# Patient Record
Sex: FEMALE | Race: WHITE | NOT HISPANIC OR LATINO | Employment: FULL TIME | URBAN - METROPOLITAN AREA
[De-identification: names, ages, dates, MRNs, and addresses within clinical notes are randomized per-mention and may not be internally consistent; named-entity substitution may affect disease eponyms.]

---

## 2018-08-02 ENCOUNTER — OFFICE VISIT (OUTPATIENT)
Dept: SURGERY | Facility: CLINIC | Age: 48
End: 2018-08-02

## 2018-08-02 VITALS — HEART RATE: 72 BPM | BODY MASS INDEX: 33.59 KG/M2 | HEIGHT: 67 IN | OXYGEN SATURATION: 98 % | WEIGHT: 214 LBS

## 2018-08-02 DIAGNOSIS — Z85.038 HISTORY OF COLON CANCER: Primary | ICD-10-CM

## 2018-08-02 DIAGNOSIS — R19.4 BOWEL HABIT CHANGES: ICD-10-CM

## 2018-08-02 PROCEDURE — 99214 OFFICE O/P EST MOD 30 MIN: CPT | Performed by: SURGERY

## 2018-08-02 RX ORDER — CEPHALEXIN 500 MG/1
500 CAPSULE ORAL DAILY
COMMUNITY
End: 2019-04-25

## 2018-08-03 NOTE — PROGRESS NOTES
SUMMARY (A/P):    47-year-old lady significant change in bowel habits and personal history of colon cancer.  Discussed options and she wishes to proceed with colonoscopy.  She understands rationale for the procedure, the nature of the procedure, and the risks.      CC:    Change in bowel habits    HPI:    47-year-old lady presents with 3 month history of significant bowel habit changes alternating between moderate constipation to moderate diarrhea associated with mild generalized abdominal pain that is worse with meals.  No rectal bleeding.  Prior to 3 months ago she was having normal regular bowel movements.    PSH:    -Colonoscopy 2016-normal  -Incisional hernia repair with mesh 2016  -Laparoscopic left colectomy 2015 for colon cancer by Dr. Nicolas Sanchez (pathology described no residual polyp or malignancy)  - ×2  -Colonoscopy 2015 by Dr. Judson Rea (pathology described fragments of tubulovillous adenoma with foci of adenocarcinoma Limited to the mucosa with microscopic focus suspicious for early invasion)    PMH:    Colon cancer  Gastroesophageal reflux disease  Hypertension    FAMILY HISTORY:    Negative for colorectal cancer    SOCIAL HISTORY:   Denies tobacco use  Denies alcohol use    ALLERGIES: reviewed, in Epic    MEDICATIONS: reviewed, in Epic    ROS:  No chest pain or shortness of air.  All other systems reviewed and negative other than presenting complaints.    PHYSICAL EXAM:   Constitutional: Well-developed well-nourished, no acute distress  Vital signs: Heart rate 72, weight 214 pounds, height 67 inches, BMI 33.5  Eyes: Conjunctiva normal, sclera nonicteric  ENMT: Hearing grossly normal, oral mucosa moist  Neck: Supple, no palpable mass, normal thyroid, trachea midline  Respiratory: Clear to auscultation, normal inspiratory effort  Cardiovascular: Regular rate, no murmur, no carotid bruit, no peripheral edema, no jugular venous distention  Gastrointestinal:  Soft, nontender, no palpable mass, no hepatosplenomegaly, negative for hernia, bowel sounds normal, well-healed incision at and below navel from previous incisional hernia repair with no evidence of recurrence  Lymphatics (palpable nodes):  cervical-negative, axillary-negative  Skin:  Warm, dry, no rash on visualized skin surfaces  Musculoskeletal: Symmetric strength, normal gait  Psychiatric: Alert and oriented ×3, normal affect     DEVANG OLMOS M.D.

## 2018-08-08 ENCOUNTER — HOSPITAL ENCOUNTER (OUTPATIENT)
Facility: HOSPITAL | Age: 48
Setting detail: HOSPITAL OUTPATIENT SURGERY
Discharge: HOME | End: 2018-08-08
Attending: SURGERY | Admitting: SURGERY

## 2018-08-08 ENCOUNTER — ANESTHESIA EVENT (OUTPATIENT)
Dept: GASTROENTEROLOGY | Facility: HOSPITAL | Age: 48
End: 2018-08-08

## 2018-08-08 ENCOUNTER — ANESTHESIA (OUTPATIENT)
Dept: GASTROENTEROLOGY | Facility: HOSPITAL | Age: 48
End: 2018-08-08

## 2018-08-08 VITALS
BODY MASS INDEX: 32.49 KG/M2 | TEMPERATURE: 98.1 F | RESPIRATION RATE: 16 BRPM | HEIGHT: 67 IN | DIASTOLIC BLOOD PRESSURE: 87 MMHG | HEART RATE: 67 BPM | WEIGHT: 207 LBS | SYSTOLIC BLOOD PRESSURE: 129 MMHG | OXYGEN SATURATION: 100 %

## 2018-08-08 PROCEDURE — 25010000002 PROPOFOL 10 MG/ML EMULSION: Performed by: ANESTHESIOLOGY

## 2018-08-08 PROCEDURE — 45378 DIAGNOSTIC COLONOSCOPY: CPT | Performed by: SURGERY

## 2018-08-08 PROCEDURE — 25010000002 PROPOFOL 1000 MG/ML EMULSION: Performed by: ANESTHESIOLOGY

## 2018-08-08 RX ORDER — SODIUM CHLORIDE 0.9 % (FLUSH) 0.9 %
3 SYRINGE (ML) INJECTION AS NEEDED
Status: DISCONTINUED | OUTPATIENT
Start: 2018-08-08 | End: 2018-08-08 | Stop reason: HOSPADM

## 2018-08-08 RX ORDER — LIDOCAINE HYDROCHLORIDE 10 MG/ML
0.5 INJECTION, SOLUTION INFILTRATION; PERINEURAL ONCE AS NEEDED
Status: DISCONTINUED | OUTPATIENT
Start: 2018-08-08 | End: 2018-08-08 | Stop reason: HOSPADM

## 2018-08-08 RX ORDER — PROPOFOL 10 MG/ML
VIAL (ML) INTRAVENOUS AS NEEDED
Status: DISCONTINUED | OUTPATIENT
Start: 2018-08-08 | End: 2018-08-08 | Stop reason: SURG

## 2018-08-08 RX ORDER — LIDOCAINE HYDROCHLORIDE 20 MG/ML
INJECTION, SOLUTION INFILTRATION; PERINEURAL AS NEEDED
Status: DISCONTINUED | OUTPATIENT
Start: 2018-08-08 | End: 2018-08-08 | Stop reason: SURG

## 2018-08-08 RX ORDER — SODIUM CHLORIDE, SODIUM LACTATE, POTASSIUM CHLORIDE, CALCIUM CHLORIDE 600; 310; 30; 20 MG/100ML; MG/100ML; MG/100ML; MG/100ML
1000 INJECTION, SOLUTION INTRAVENOUS CONTINUOUS
Status: DISCONTINUED | OUTPATIENT
Start: 2018-08-08 | End: 2018-08-08 | Stop reason: HOSPADM

## 2018-08-08 RX ADMIN — SODIUM CHLORIDE, POTASSIUM CHLORIDE, SODIUM LACTATE AND CALCIUM CHLORIDE 1000 ML: 600; 310; 30; 20 INJECTION, SOLUTION INTRAVENOUS at 09:54

## 2018-08-08 RX ADMIN — PROPOFOL 150 MG: 10 INJECTION, EMULSION INTRAVENOUS at 10:06

## 2018-08-08 RX ADMIN — LIDOCAINE HYDROCHLORIDE 50 MG: 20 INJECTION, SOLUTION INFILTRATION; PERINEURAL at 10:06

## 2018-08-08 RX ADMIN — PROPOFOL 140 MCG/KG/MIN: 10 INJECTION, EMULSION INTRAVENOUS at 10:06

## 2018-08-08 NOTE — OP NOTE
PREOPERATIVE DIAGNOSIS:  -Change in bowel habits  -Personal history of colon cancer status post left colectomy December 2015 for tubulovillous adenoma with foci of adenocarcinoma limited to the mucosa    POSTOPERATIVE DIAGNOSIS AND FINDINGS:  Normal mucosa  Diverticulosis    PROCEDURE:  Colonoscopy to cecum     SURGEON:  Roverto Sapp MD    ANESTHESIA:  MAC    SPECIMEN(S):  none    DESCRIPTION:  In decubitus position digital rectal exam was normal. Colonoscope inserted under direct visualization of lumen to cecum confirmed by visualization of ileocecal valve and appendiceal orifice.    Scope slowly withdrawn circumferentially examining all mucosal surfaces.    Quality of bowel preparation good.    There were no mucosal abnormalities.     Scattered diverticulosis seen.    Tolerated well.    RECOMMENDATION FOR FUTURE SURVEILLANCE:  3 years    Roverto Sapp M.D.

## 2018-08-08 NOTE — ANESTHESIA PREPROCEDURE EVALUATION
Anesthesia Evaluation     Patient summary reviewed   NPO Solid Status: > 8 hours  NPO Liquid Status: > 8 hours           Airway   Mallampati: II  TM distance: >3 FB  Dental      Pulmonary    Cardiovascular     Rhythm: regular  Rate: normal    (+) hypertension,       Neuro/Psych  GI/Hepatic/Renal/Endo    (+) obesity,  GERD,      Musculoskeletal     Abdominal    Substance History      OB/GYN          Other      history of cancer remission                    Anesthesia Plan    ASA 2     MAC   total IV anesthesia  Anesthetic plan and risks discussed with patient.

## 2018-08-08 NOTE — ANESTHESIA POSTPROCEDURE EVALUATION
"Patient: Shannon Ley    Procedure Summary     Date:  08/08/18 Room / Location:   [facility]    Anesthesia Start:  1002 Anesthesia Stop:  1016    Procedure:  COLONOSCOPY TO CECUM (N/A ) Diagnosis:       History of colon cancer      Bowel habit changes      (History of colon cancer [Z85.038])      (Bowel habit changes [R19.4])    Surgeon:  Roverto Sapp MD Provider:  Sidra Martino MD    Anesthesia Type:  MAC ASA Status:  2          Anesthesia Type: MAC  Last vitals  BP   104/62 (08/08/18 1017)   Temp   36.7 °C (98.1 °F) (08/08/18 0939)   Pulse   62 (08/08/18 1017)   Resp   16 (08/08/18 1017)     SpO2   96 % (08/08/18 1017)     Post Anesthesia Care and Evaluation    Patient location during evaluation: bedside  Patient participation: complete - patient participated  Level of consciousness: awake and alert  Pain management: adequate  Airway patency: patent  Anesthetic complications: No anesthetic complications  PONV Status: none  Cardiovascular status: acceptable  Respiratory status: acceptable  Hydration status: acceptable    Comments: /62 (BP Location: Left arm, Patient Position: Lying)   Pulse 62   Temp 36.7 °C (98.1 °F) (Oral)   Resp 16   Ht 170.2 cm (67\")   Wt 93.9 kg (207 lb)   SpO2 96%   BMI 32.42 kg/m²         "

## 2021-05-06 ENCOUNTER — OFFICE VISIT (OUTPATIENT)
Dept: SURGERY | Facility: CLINIC | Age: 51
End: 2021-05-06

## 2021-05-06 VITALS — BODY MASS INDEX: 33.33 KG/M2 | HEIGHT: 69 IN | WEIGHT: 225 LBS

## 2021-05-06 DIAGNOSIS — Z85.038 HX OF COLON CANCER, STAGE I: ICD-10-CM

## 2021-05-06 DIAGNOSIS — R19.7 DIARRHEA, UNSPECIFIED TYPE: Primary | ICD-10-CM

## 2021-05-06 DIAGNOSIS — K62.5 RECTAL BLEEDING: ICD-10-CM

## 2021-05-06 DIAGNOSIS — R19.8 CHANGE IN BOWEL MOVEMENT: ICD-10-CM

## 2021-05-06 PROCEDURE — 99214 OFFICE O/P EST MOD 30 MIN: CPT | Performed by: SURGERY

## 2021-05-06 RX ORDER — CYCLOBENZAPRINE HCL 10 MG
TABLET ORAL
COMMUNITY
Start: 2021-03-15 | End: 2021-05-06

## 2021-05-06 RX ORDER — ERGOCALCIFEROL 1.25 MG/1
5000 CAPSULE ORAL DAILY
COMMUNITY

## 2021-05-09 NOTE — PROGRESS NOTES
"SUMMARY (A/P):    50-year-old lady presents with personal history of T1 colon cancer, intermittent diarrhea, occasional bright red blood per rectum, change in bowel habits with \"pencil thin stools\", and family history of colon cancer in her father and paternal grandfather.  With this presentation, I have recommended further evaluation with colonoscopy (including random biopsies) and she has scheduled accordingly.      CC:    Diarrhea, referred for consultation by Dr. Clarisa Graham    HPI:    50-year-old lady presents with personal history of T1 left colon cancer and recent history in the last 2 months of intermittent diarrhea, occasional bright red blood per rectum, and change in bowel habits with what she describes as pencil thin stools.  Additionally, she has family history of colon cancer in her father and paternal grandfather.    ENDOSCOPY:   • Colonoscopy 2018: Diverticulosis  • Colonoscopy 2015 by Dr. Rea (pathology described fragments of tubulovillous adenoma with foci of adenocarcinoma limited to the mucosa with microscopic focus suspicious for early invasion)    RADIOLOGY:   •     LABS:    •     PREVIOUS ABDOMINAL SURGERY    • Open incisional hernia repair with mesh 2016  • Laparoscopic left colectomy 2015 Dr. Nicolas Sanchez for tubulovillous adenoma with foci of adenocarcinoma limited to the mucosa (pathology from the surgery described no residual polyp or malignancy)  •  section x2    PMH:    • T1 colon cancer   • Hypertension  • Gastroesophageal reflux disease    ALLERGIES:   • Oxycodone  • Latex  • Sulfa    MEDICATIONS:   • None    FAMILY HISTORY:    • Colorectal cancer: Father, paternal grandfather (she only recently became aware of this part of her family history)    SOCIAL HISTORY:   • Denies tobacco use  • Occasional alcohol use    PHYSICAL EXAM:   • Constitutional: Well-developed well-nourished, no acute distress  • Vital signs: Weight 225 pounds, height " 69 inches, BMI 33.2  • Eyes: Conjunctiva normal, sclera nonicteric  • ENMT: Hearing grossly normal, oral mucosa moist  • Neck: Supple, no palpable mass, trachea midline  • Respiratory: Clear to auscultation, normal inspiratory effort  • Cardiovascular: Regular rate, no murmur, no carotid bruit  • Gastrointestinal: Soft, nontender, no palpable mass, no hepatosplenomegaly, negative for hernia  • Lymphatics (palpable nodes):  cervical-negative, axillary-negative  • Skin:  Warm, dry, no rash on visualized skin surfaces  • Musculoskeletal: Symmetric strength, normal gait  • Psychiatric: Alert and oriented ×3, normal affect     ROS:    No cough, chest pain, shortness of air.  All other systems reviewed and negative other than presenting complaints.    DEVANG OLMOS M.D.

## 2021-06-10 ENCOUNTER — HOSPITAL ENCOUNTER (OUTPATIENT)
Facility: HOSPITAL | Age: 51
Setting detail: HOSPITAL OUTPATIENT SURGERY
Discharge: HOME | End: 2021-06-10
Attending: SURGERY | Admitting: SURGERY

## 2021-06-10 ENCOUNTER — ANESTHESIA (OUTPATIENT)
Dept: GASTROENTEROLOGY | Facility: HOSPITAL | Age: 51
End: 2021-06-10

## 2021-06-10 ENCOUNTER — ANESTHESIA EVENT (OUTPATIENT)
Dept: GASTROENTEROLOGY | Facility: HOSPITAL | Age: 51
End: 2021-06-10

## 2021-06-10 VITALS
TEMPERATURE: 98.2 F | WEIGHT: 220 LBS | RESPIRATION RATE: 16 BRPM | HEART RATE: 77 BPM | OXYGEN SATURATION: 99 % | BODY MASS INDEX: 32.58 KG/M2 | SYSTOLIC BLOOD PRESSURE: 131 MMHG | HEIGHT: 69 IN | DIASTOLIC BLOOD PRESSURE: 87 MMHG

## 2021-06-10 LAB
B-HCG UR QL: NEGATIVE
INTERNAL NEGATIVE CONTROL: NORMAL
INTERNAL POSITIVE CONTROL: NORMAL
Lab: NORMAL

## 2021-06-10 PROCEDURE — 25010000002 PROPOFOL 10 MG/ML EMULSION: Performed by: NURSE ANESTHETIST, CERTIFIED REGISTERED

## 2021-06-10 PROCEDURE — 81025 URINE PREGNANCY TEST: CPT | Performed by: SURGERY

## 2021-06-10 PROCEDURE — S0260 H&P FOR SURGERY: HCPCS | Performed by: SURGERY

## 2021-06-10 PROCEDURE — 45378 DIAGNOSTIC COLONOSCOPY: CPT | Performed by: SURGERY

## 2021-06-10 RX ORDER — SODIUM CHLORIDE, SODIUM LACTATE, POTASSIUM CHLORIDE, CALCIUM CHLORIDE 600; 310; 30; 20 MG/100ML; MG/100ML; MG/100ML; MG/100ML
30 INJECTION, SOLUTION INTRAVENOUS CONTINUOUS PRN
Status: DISCONTINUED | OUTPATIENT
Start: 2021-06-10 | End: 2021-06-10 | Stop reason: HOSPADM

## 2021-06-10 RX ORDER — SODIUM CHLORIDE 0.9 % (FLUSH) 0.9 %
10 SYRINGE (ML) INJECTION AS NEEDED
Status: DISCONTINUED | OUTPATIENT
Start: 2021-06-10 | End: 2021-06-10 | Stop reason: HOSPADM

## 2021-06-10 RX ORDER — PROPOFOL 10 MG/ML
VIAL (ML) INTRAVENOUS AS NEEDED
Status: DISCONTINUED | OUTPATIENT
Start: 2021-06-10 | End: 2021-06-10 | Stop reason: SURG

## 2021-06-10 RX ORDER — PROPOFOL 10 MG/ML
VIAL (ML) INTRAVENOUS CONTINUOUS PRN
Status: DISCONTINUED | OUTPATIENT
Start: 2021-06-10 | End: 2021-06-10 | Stop reason: SURG

## 2021-06-10 RX ORDER — PHENOL 1.4 %
1 AEROSOL, SPRAY (ML) MUCOUS MEMBRANE DAILY
COMMUNITY

## 2021-06-10 RX ORDER — SODIUM CHLORIDE 0.9 % (FLUSH) 0.9 %
3 SYRINGE (ML) INJECTION EVERY 12 HOURS SCHEDULED
Status: DISCONTINUED | OUTPATIENT
Start: 2021-06-10 | End: 2021-06-10 | Stop reason: HOSPADM

## 2021-06-10 RX ORDER — LIDOCAINE HYDROCHLORIDE 20 MG/ML
INJECTION, SOLUTION INFILTRATION; PERINEURAL AS NEEDED
Status: DISCONTINUED | OUTPATIENT
Start: 2021-06-10 | End: 2021-06-10 | Stop reason: SURG

## 2021-06-10 RX ADMIN — SODIUM CHLORIDE, POTASSIUM CHLORIDE, SODIUM LACTATE AND CALCIUM CHLORIDE 30 ML/HR: 600; 310; 30; 20 INJECTION, SOLUTION INTRAVENOUS at 13:14

## 2021-06-10 RX ADMIN — PROPOFOL 100 MG: 10 INJECTION, EMULSION INTRAVENOUS at 13:31

## 2021-06-10 RX ADMIN — PROPOFOL 160 MCG/KG/MIN: 10 INJECTION, EMULSION INTRAVENOUS at 13:31

## 2021-06-10 RX ADMIN — LIDOCAINE HYDROCHLORIDE 60 MG: 20 INJECTION, SOLUTION INFILTRATION; PERINEURAL at 13:29

## 2021-06-10 NOTE — DISCHARGE INSTRUCTIONS
For the next 24 hours patient needs to be with a responsible adult.    For 24 hours DO NOT drive, operate machinery, appliances, drink alcohol, make important decisions or sign legal documents.    Start with a light or bland diet if you are feeling sick to your stomach otherwise advance to regular diet as tolerated.    Follow recommendations on procedure report if provided by your doctor.    Call Dr. Sapp for problems [phone]    Problems may include but not limited to: large amounts of bleeding, trouble breathing, repeated vomiting, severe unrelieved pain, fever or chills.

## 2021-06-10 NOTE — OP NOTE
PREOPERATIVE DIAGNOSIS:  High risk screening secondary to personal history of colon cancer and family history of colon cancer in father    POSTOPERATIVE DIAGNOSIS AND FINDINGS:  Normal postoperative anatomy    PROCEDURE:  Colonoscopy to terminal ileum    SURGEON:  Roverto Sapp MD    ANESTHESIA:  MAC    SPECIMEN(S):  none    DESCRIPTION:  In decubitus position digital rectal exam was normal. Colonoscope inserted under direct visualization of lumen to cecum confirmed by visualization of ileocecal valve and appendiceal orifice.  Ileocecal valve intubated and terminal ileum grossly normal.    Scope slowly withdrawn circumferentially examining all mucosal surfaces.    Quality of bowel preparation good.    There were no mucosal abnormalities.  Left-sided colocolonic anastomosis identified and widely patent.    Tolerated well.    RECOMMENDATION FOR FUTURE SURVEILLANCE:  5 years    Roverto Sapp M.D.

## 2021-06-10 NOTE — ANESTHESIA PREPROCEDURE EVALUATION
Anesthesia Evaluation     Patient summary reviewed and Nursing notes reviewed                Airway   Mallampati: II  TM distance: >3 FB  Neck ROM: full  No difficulty expected  Dental - normal exam     Pulmonary - normal exam   Cardiovascular - normal exam    (+) hypertension less than 2 medications,       Neuro/Psych  (+) headaches,       ROS Comment: Migraines  GI/Hepatic/Renal/Endo    (+) obesity,  GERD, GI bleeding , renal disease stones,     ROS Comment: Hx colon resection for CA 5 yrs ago    Musculoskeletal     Abdominal   (+) obese,    Substance History      OB/GYN          Other      history of cancer      Other Comment: Hx colon CA                Anesthesia Plan    ASA 2     MAC     intravenous induction       Plan discussed with CRNA.

## 2021-06-10 NOTE — ANESTHESIA POSTPROCEDURE EVALUATION
"Patient: Shannon Ley    Procedure Summary     Date: 06/10/21 Room / Location:  [facility]    Anesthesia Start: 1326 Anesthesia Stop: 1346    Procedure: COLONOSCOPY (N/A ) Diagnosis:       Diarrhea, unspecified type      Rectal bleeding      Change in bowel movement      Hx of colon cancer, stage I      (Diarrhea, unspecified type [R19.7])      (Rectal bleeding [K62.5])      (Change in bowel movement [R19.8])      (Hx of colon cancer, stage I [Z85.038])    Surgeons: Roverto Sapp MD Provider: Anthony Osborne MD    Anesthesia Type: MAC ASA Status: 2          Anesthesia Type: MAC    Vitals  Vitals Value Taken Time   /74 06/10/21 1347   Temp     Pulse 70 06/10/21 1347   Resp 16 06/10/21 1347   SpO2 97 % 06/10/21 1347           Post Anesthesia Care and Evaluation    Patient location during evaluation: bedside  Patient participation: complete - patient participated  Level of consciousness: awake  Pain management: adequate  Airway patency: patent  Anesthetic complications: No anesthetic complications    Cardiovascular status: acceptable  Respiratory status: acceptable  Hydration status: acceptable    Comments: */74 (BP Location: Left arm, Patient Position: Lying)   Pulse 70   Temp 36.8 °C (98.2 °F) (Oral)   Resp 16   Ht 175.3 cm (69\")   Wt 99.8 kg (220 lb)   LMP 01/25/2019   SpO2 97%   BMI 32.49 kg/m²         "

## 2021-06-10 NOTE — NURSING NOTE
Pt had questions for Dr. Sapp, wanted to wait to talk to him before she left.  MD in speaking with pt and her mother.

## 2021-06-10 NOTE — H&P
HPI: Surveillance colonoscopy secondary to personal history of colon cancer status post left colectomy December 2015 for tubulovillous adenoma with foci of adenocarcinoma limited to the mucosa as well as family history of colon cancer in father and paternal grandfather. Having intermittent, but not bothersome, diarrhea. Rectal bleeding has resolved since office visit.    PMH, PSH, MEDS AND ALLERGIES reviewed and reconciled with [facility]    PHYSICAL EXAM:  -  Constitutional:  no acute distress  -  Respiratory:  normal inspiratory effort  -  Cardiovascular: regular rate  -  Gastrointestinal: Soft    ASSESSMENT/PLAN:    Colonoscopy    Roverto Sapp M.D.

## 2023-07-18 ENCOUNTER — APPOINTMENT (OUTPATIENT)
Dept: GENERAL RADIOLOGY | Facility: HOSPITAL | Age: 53
End: 2023-07-18
Payer: COMMERCIAL

## 2023-07-18 ENCOUNTER — APPOINTMENT (OUTPATIENT)
Dept: CT IMAGING | Facility: HOSPITAL | Age: 53
End: 2023-07-18
Payer: COMMERCIAL

## 2023-07-18 ENCOUNTER — HOSPITAL ENCOUNTER (EMERGENCY)
Facility: HOSPITAL | Age: 53
Discharge: HOME | End: 2023-07-18
Attending: EMERGENCY MEDICINE | Admitting: EMERGENCY MEDICINE
Payer: COMMERCIAL

## 2023-07-18 VITALS
OXYGEN SATURATION: 94 % | RESPIRATION RATE: 16 BRPM | HEIGHT: 69 IN | DIASTOLIC BLOOD PRESSURE: 91 MMHG | WEIGHT: 215 LBS | TEMPERATURE: 98.5 F | HEART RATE: 88 BPM | BODY MASS INDEX: 31.84 KG/M2 | SYSTOLIC BLOOD PRESSURE: 145 MMHG

## 2023-07-18 DIAGNOSIS — M25.461 EFFUSION OF RIGHT KNEE: ICD-10-CM

## 2023-07-18 DIAGNOSIS — S50.02XA CONTUSION OF LEFT ELBOW, INITIAL ENCOUNTER: Primary | ICD-10-CM

## 2023-07-18 PROCEDURE — 72125 CT NECK SPINE W/O DYE: CPT

## 2023-07-18 PROCEDURE — 73080 X-RAY EXAM OF ELBOW: CPT

## 2023-07-18 PROCEDURE — 73562 X-RAY EXAM OF KNEE 3: CPT

## 2023-07-18 PROCEDURE — 70450 CT HEAD/BRAIN W/O DYE: CPT

## 2023-07-18 PROCEDURE — 71101 X-RAY EXAM UNILAT RIBS/CHEST: CPT

## 2023-07-18 PROCEDURE — 99282 EMERGENCY DEPT VISIT SF MDM: CPT

## 2023-07-18 NOTE — FSED PROVIDER NOTE
Subjective   History of Present Illness  Patient is a 52-year-old female who presents complaining of neck, left elbow, right rib and knee pain status post mechanical fall at [employer].  States she slipped in the parking lot.  She denies hitting her head, loss of consciousness.  Denies any back pain or anticoagulant use.    Review of Systems   Musculoskeletal:  Positive for arthralgias and neck pain.   All other systems reviewed and are negative.    Past Medical History:   Diagnosis Date    Basal cell carcinoma     Breast mass 2018    benign    Bruises easily     Colon cancer 2015    Foci of adenocarcinoma found within fragements of tubulovillous adenoma of the descending colon    Colon polyps 2015    Descending colon: fragments of tubulovillous adenoma with foci of adenocarcinoma     GERD (gastroesophageal reflux disease)     History of migraine     Hypertension     Incisional hernia     Kidney stone     LEFT KIDNEY PER CT SCAN    Seasonal allergies        Allergies   Allergen Reactions    Sulfa Antibiotics Hives, Itching and Rash    Oxycodone Itching, Nausea Only and Other (See Comments)     Insomnia    Latex Itching    Penicillins Rash       Past Surgical History:   Procedure Laterality Date    BASAL CELL CARCINOMA EXCISION       SECTION N/A . 2009    X 2    COLON RESECTION N/A 2015    Laparoscopic bowel/colon resection-Dr. Erich Sanchez [facility]    COLONOSCOPY N/A 2018    Procedure: COLONOSCOPY TO CECUM;  Surgeon: Roverto Sapp MD;  Location: [facility] ENDOSCOPY;  Service: Gastroenterology    COLONOSCOPY N/A 6/10/2021    Procedure: COLONOSCOPY;  Surgeon: Roverto Sapp MD;  Location: [facility] ENDOSCOPY;  Service: General;  Laterality: N/A;  PRE- HX COLON CANCER  POST-- S/P COLECTOMY, NORMAL    COLONOSCOPY W/ BIOPSIES N/A 2016    Normal colonoscopy, repeat in 5 years-Dr. Judson Rea    COLONOSCOPY W/ BIOPSIES AND POLYPECTOMY N/A 2015    Sessile malignant  polyp resected from the left colon on a prior colonoscopy, patient will require segmental colon resection to ensure no residual tumor, marked with Shahla ink-Dr. Judosn Rea    ENDOMETRIAL BIOPSY N/A 11/30/2016    Zoraida Bergeron PA-C-[facility]    VENTRAL/INCISIONAL HERNIA REPAIR N/A 4/29/2016    Procedure: VENTRAL/INCISIONAL HERNIA REPAIR WIITH MESH;  Surgeon: Roverto Sapp MD;  Location: [facility] OR;  Service:        Family History   Problem Relation Age of Onset    Breast cancer Maternal Aunt     Breast cancer Maternal Grandmother     Colon cancer Father     Colon polyps Father     Colon cancer Paternal Grandfather        Social History     Socioeconomic History    Marital status:    Tobacco Use    Smoking status: Never    Smokeless tobacco: Never   Substance and Sexual Activity    Alcohol use: Yes     Comment: 1-2x per month    Drug use: No    Sexual activity: Defer           Objective   Physical Exam  Vitals and nursing note reviewed.   Constitutional:       Appearance: Normal appearance.   HENT:      Head: Normocephalic and atraumatic.   Cardiovascular:      Rate and Rhythm: Normal rate and regular rhythm.      Pulses: Normal pulses.   Pulmonary:      Effort: Pulmonary effort is normal.      Breath sounds: Normal breath sounds.   Musculoskeletal:         General: Tenderness (Right knee and left elbow) present.      Cervical back: Normal range of motion and neck supple. No tenderness.   Skin:     General: Skin is warm and dry.      Capillary Refill: Capillary refill takes less than 2 seconds.   Neurological:      General: No focal deficit present.      Mental Status: She is oriented to person, place, and time.       Procedures           ED Course                                           Medical Decision Making  CT of head and neck negative for acute findings.  X-ray of left elbow, right ribs negative.  Mild knee effusion noted to right knee.  Patient is nontoxic-appearing, she is to  follow-up as an outpatient.  Given strict return precautions.    Problems Addressed:  Contusion of left elbow, initial encounter: complicated acute illness or injury  Effusion of right knee: complicated acute illness or injury    Amount and/or Complexity of Data Reviewed  Radiology: ordered.        Final diagnoses:   Contusion of left elbow, initial encounter   Effusion of right knee       ED Disposition  ED Disposition       ED Disposition   Discharge    Condition   Stable    Comment   --               Clarisa Graham MD  [address] [id number] [phone]    Call   If symptoms worsen         Medication List      No changes were made to your prescriptions during this visit.

## (undated) DEVICE — KT ORCA ORCAPOD DISP STRL

## (undated) DEVICE — SENSR O2 OXIMAX FNGR A/ 18IN NONSTR

## (undated) DEVICE — THE TORRENT IRRIGATION SCOPE CONNECTOR IS USED WITH THE TORRENT IRRIGATION TUBING TO PROVIDE IRRIGATION FLUIDS SUCH AS STERILE WATER DURING GASTROINTESTINAL ENDOSCOPIC PROCEDURES WHEN USED IN CONJUNCTION WITH AN IRRIGATION PUMP (OR ELECTROSURGICAL UNIT).: Brand: TORRENT

## (undated) DEVICE — CANNULA,ADULT,SOFT-TOUCH,7'TUBE,UC: Brand: PENDING

## (undated) DEVICE — CANN O2 ETCO2 FITS ALL CONN CO2 SMPL A/ 7IN DISP LF

## (undated) DEVICE — TUBING, SUCTION, 1/4" X 10', STRAIGHT: Brand: MEDLINE

## (undated) DEVICE — ADAPT CLN BIOGUARD AIR/H2O DISP

## (undated) DEVICE — Device: Brand: DEFENDO AIR/WATER/SUCTION AND BIOPSY VALVE

## (undated) DEVICE — LN SMPL CO2 SHTRM SD STREAM W/M LUER